# Patient Record
Sex: FEMALE | Race: WHITE
[De-identification: names, ages, dates, MRNs, and addresses within clinical notes are randomized per-mention and may not be internally consistent; named-entity substitution may affect disease eponyms.]

---

## 2019-03-15 ENCOUNTER — HOSPITAL ENCOUNTER (EMERGENCY)
Dept: HOSPITAL 8 - ED | Age: 59
Discharge: HOME | End: 2019-03-15
Payer: MEDICAID

## 2019-03-15 VITALS — SYSTOLIC BLOOD PRESSURE: 98 MMHG | DIASTOLIC BLOOD PRESSURE: 39 MMHG

## 2019-03-15 VITALS — WEIGHT: 218.26 LBS | BODY MASS INDEX: 38.67 KG/M2 | HEIGHT: 63 IN

## 2019-03-15 DIAGNOSIS — E11.9: ICD-10-CM

## 2019-03-15 DIAGNOSIS — M13.152: Primary | ICD-10-CM

## 2019-03-15 DIAGNOSIS — M54.42: ICD-10-CM

## 2019-03-15 DIAGNOSIS — I10: ICD-10-CM

## 2019-03-15 PROCEDURE — 96372 THER/PROPH/DIAG INJ SC/IM: CPT

## 2019-03-15 PROCEDURE — 99283 EMERGENCY DEPT VISIT LOW MDM: CPT

## 2019-03-15 PROCEDURE — 73502 X-RAY EXAM HIP UNI 2-3 VIEWS: CPT

## 2019-03-15 PROCEDURE — 72110 X-RAY EXAM L-2 SPINE 4/>VWS: CPT

## 2019-03-15 NOTE — NUR
PT VERBALIZES SOME RELIEF AFTER PAIN MEDS. D/C INSTRUCTIONS, MEDS, & F/U APPT 
RV'WD WITH PT, SHE VERBALIZES UNDERSTANDING. ASSISTED PT OUT OF ED VIA WC. 
FRIEND TO DRIVE PT HOME.